# Patient Record
Sex: FEMALE | Race: OTHER | HISPANIC OR LATINO
[De-identification: names, ages, dates, MRNs, and addresses within clinical notes are randomized per-mention and may not be internally consistent; named-entity substitution may affect disease eponyms.]

---

## 2019-03-25 ENCOUNTER — APPOINTMENT (OUTPATIENT)
Dept: INTERNAL MEDICINE | Facility: CLINIC | Age: 64
End: 2019-03-25
Payer: COMMERCIAL

## 2019-03-25 VITALS
HEIGHT: 62 IN | HEART RATE: 88 BPM | SYSTOLIC BLOOD PRESSURE: 110 MMHG | OXYGEN SATURATION: 99 % | BODY MASS INDEX: 24.48 KG/M2 | TEMPERATURE: 98.5 F | WEIGHT: 133 LBS | DIASTOLIC BLOOD PRESSURE: 80 MMHG

## 2019-03-25 DIAGNOSIS — F17.200 NICOTINE DEPENDENCE, UNSPECIFIED, UNCOMPLICATED: ICD-10-CM

## 2019-03-25 DIAGNOSIS — Z82.5 FAMILY HISTORY OF ASTHMA AND OTHER CHRONIC LOWER RESPIRATORY DISEASES: ICD-10-CM

## 2019-03-25 DIAGNOSIS — Z83.79 FAMILY HISTORY OF OTHER DISEASES OF THE DIGESTIVE SYSTEM: ICD-10-CM

## 2019-03-25 DIAGNOSIS — R04.2 HEMOPTYSIS: ICD-10-CM

## 2019-03-25 DIAGNOSIS — E78.5 HYPERLIPIDEMIA, UNSPECIFIED: ICD-10-CM

## 2019-03-25 PROBLEM — Z00.00 ENCOUNTER FOR PREVENTIVE HEALTH EXAMINATION: Status: ACTIVE | Noted: 2019-03-25

## 2019-03-25 PROCEDURE — 99204 OFFICE O/P NEW MOD 45 MIN: CPT

## 2019-03-25 NOTE — REVIEW OF SYSTEMS
[Recent Change In Weight] : ~T recent weight change [Negative] : Heme/Lymph [FreeTextEntry2] : 20 lbs

## 2019-03-25 NOTE — HISTORY OF PRESENT ILLNESS
[FreeTextEntry1] : Patient for evaluation of hemoptysis [de-identified] : Patient is a former one pack per day smoker who currently smokes between one and 3 cigarettes per day. Apparently she was in her usual state of health when last week she coughed up blood. She also coughed up blood she describes it as a tissue. Over the past week she's had intermittent bouts of coughing up blood. She is unclear exactly how many times she coughed up blood. She denies significant shortness of breath or wheezing no fever or chills. She may have lost up to 20 pounds over the past one year. Her last CAT scan of the chest was a low dose CAT scan last May which was essentially unremarkable with a few tiny granuloma. Her last PPD in 2013 was negative. She works in the kitchen at [employer]. She is . The history is obtained through an  she speaks only [place].

## 2019-03-25 NOTE — ASSESSMENT
[FreeTextEntry1] : Patient with a history of hemoptysis and a smoker. Patient's last CAT scan of the chest was May of 2018. We'll repeat a CAT scan of the chest urgently. Will discuss results with the patient after the study is done and treat accordingly.

## 2019-03-28 ENCOUNTER — MEDICATION RENEWAL (OUTPATIENT)
Age: 64
End: 2019-03-28

## 2019-04-15 ENCOUNTER — RESULT REVIEW (OUTPATIENT)
Age: 64
End: 2019-04-15